# Patient Record
Sex: MALE | Race: WHITE
[De-identification: names, ages, dates, MRNs, and addresses within clinical notes are randomized per-mention and may not be internally consistent; named-entity substitution may affect disease eponyms.]

---

## 2020-07-26 ENCOUNTER — HOSPITAL ENCOUNTER (EMERGENCY)
Dept: HOSPITAL 94 - ER | Age: 60
LOS: 3 days | Discharge: HOME | End: 2020-07-29
Payer: COMMERCIAL

## 2020-07-26 VITALS — BODY MASS INDEX: 26.73 KG/M2 | WEIGHT: 176.37 LBS | HEIGHT: 68 IN

## 2020-07-26 DIAGNOSIS — F29: Primary | ICD-10-CM

## 2020-07-26 LAB
ALBUMIN SERPL BCP-MCNC: 3.5 G/DL (ref 3.4–5)
ALBUMIN/GLOB SERPL: 1.1 {RATIO} (ref 1.1–1.5)
ALP SERPL-CCNC: 84 IU/L (ref 46–116)
ALT SERPL W P-5'-P-CCNC: 53 U/L (ref 12–78)
AMPHETAMINES UR QL SCN: POSITIVE
ANION GAP SERPL CALCULATED.3IONS-SCNC: 10 MMOL/L (ref 8–16)
AST SERPL W P-5'-P-CCNC: 75 U/L (ref 10–37)
BARBITURATES UR QL SCN: NEGATIVE
BASOPHILS # BLD AUTO: 0 X10'3 (ref 0–0.2)
BASOPHILS NFR BLD AUTO: 0.4 % (ref 0–1)
BENZODIAZ UR QL SCN: NEGATIVE
BILIRUB SERPL-MCNC: 0.6 MG/DL (ref 0.1–1)
BUN SERPL-MCNC: 27 MG/DL (ref 7–18)
BUN/CREAT SERPL: 20.9 (ref 5.4–32)
BZE UR QL SCN: NEGATIVE
CALCIUM SERPL-MCNC: 8.3 MG/DL (ref 8.5–10.1)
CANNABINOIDS UR QL SCN: NEGATIVE
CHLORIDE SERPL-SCNC: 105 MMOL/L (ref 99–107)
CO2 SERPL-SCNC: 25.9 MMOL/L (ref 24–32)
CREAT SERPL-MCNC: 1.29 MG/DL (ref 0.6–1.1)
EOSINOPHIL # BLD AUTO: 0.2 X10'3 (ref 0–0.9)
EOSINOPHIL NFR BLD AUTO: 2 % (ref 0–6)
ERYTHROCYTE [DISTWIDTH] IN BLOOD BY AUTOMATED COUNT: 12.9 % (ref 11.5–14.5)
ETHANOL SERPL-MCNC: < 0.01 GM/DL (ref 0–0.01)
GFR SERPL CREATININE-BSD FRML MDRD: 57 ML/MIN
GLUCOSE SERPL-MCNC: 93 MG/DL (ref 70–104)
HCT VFR BLD AUTO: 41 % (ref 42–52)
HGB BLD-MCNC: 14 G/DL (ref 14–17.9)
LYMPHOCYTES # BLD AUTO: 1.7 X10'3 (ref 1.1–4.8)
LYMPHOCYTES NFR BLD AUTO: 20.1 % (ref 21–51)
MCH RBC QN AUTO: 32.3 PG (ref 27–31)
MCHC RBC AUTO-ENTMCNC: 34.2 G/DL (ref 33–36.5)
MCV RBC AUTO: 94.3 FL (ref 78–98)
METHADONE UR QL SCN: NEGATIVE
MONOCYTES # BLD AUTO: 0.7 X10'3 (ref 0–0.9)
MONOCYTES NFR BLD AUTO: 8.2 % (ref 2–12)
NEUTROPHILS # BLD AUTO: 5.7 X10'3 (ref 1.8–7.7)
NEUTROPHILS NFR BLD AUTO: 69.3 % (ref 42–75)
OPIATES UR QL SCN: NEGATIVE
PCP UR QL SCN: NEGATIVE
PLATELET # BLD AUTO: 248 X10'3 (ref 140–440)
PMV BLD AUTO: 9 FL (ref 7.4–10.4)
POTASSIUM SERPL-SCNC: 2.9 MMOL/L (ref 3.5–5.1)
PROT SERPL-MCNC: 6.6 G/DL (ref 6.4–8.2)
RBC # BLD AUTO: 4.35 X10'6 (ref 4.7–6.1)
SODIUM SERPL-SCNC: 141 MMOL/L (ref 135–145)
WBC # BLD AUTO: 8.3 X10'3 (ref 4.5–11)

## 2020-07-26 PROCEDURE — 81003 URINALYSIS AUTO W/O SCOPE: CPT

## 2020-07-26 PROCEDURE — 80053 COMPREHEN METABOLIC PANEL: CPT

## 2020-07-26 PROCEDURE — 36415 COLL VENOUS BLD VENIPUNCTURE: CPT

## 2020-07-26 PROCEDURE — 85025 COMPLETE CBC W/AUTO DIFF WBC: CPT

## 2020-07-26 PROCEDURE — 80305 DRUG TEST PRSMV DIR OPT OBS: CPT

## 2020-07-26 PROCEDURE — 84132 ASSAY OF SERUM POTASSIUM: CPT

## 2020-07-26 PROCEDURE — 84443 ASSAY THYROID STIM HORMONE: CPT

## 2020-07-26 PROCEDURE — 80320 DRUG SCREEN QUANTALCOHOLS: CPT

## 2020-07-26 PROCEDURE — 99291 CRITICAL CARE FIRST HOUR: CPT

## 2020-07-26 PROCEDURE — 96372 THER/PROPH/DIAG INJ SC/IM: CPT

## 2020-07-26 NOTE — NUR
PATIENT IS ASLEEP NOW. LABWORK DRAWN PER , WITH STAFF ASSISTANCE. 
PATIENT REMAINS ASLEEP WITH HR 90/MIN AND O2 SAT 93% ON ROOM AIR.

## 2020-07-26 NOTE — NUR
PATIENT IS ANXIOUS AND TALKING WITH UNORGANIZED THOUGHT PROCESS. UNCOOPERATIVE 
WITH IV START, LAB DRAW, AND PUTTING ON GREEN SCRUBS. CONTINUES TO ONLY ASK FOR 
WATER AND FOOD. 4 OZ ORANGE JUICE GIVEN WITH ORAL MEDICATION.
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
## 2020-07-26 NOTE — NUR
RR is 18, patient is lying flat on his back in bed, snoring loudly. Patient 
does not appear to be in any distress.

## 2020-07-27 LAB — POTASSIUM SERPL-SCNC: 3.9 MMOL/L (ref 3.5–5.1)

## 2020-07-27 RX ADMIN — POTASSIUM CHLORIDE PRN MEQ: 1500 TABLET, EXTENDED RELEASE ORAL at 01:39

## 2020-07-27 RX ADMIN — POTASSIUM CHLORIDE PRN MEQ: 1500 TABLET, EXTENDED RELEASE ORAL at 05:41
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
## 2020-07-27 NOTE — NUR
RECEIVED REPORT FROM AMELIA HALE. PT IS CURRENTLY LYING IN HIS BED ON HIS LEFT 
SIDE WTIH BLANKETS COVERING TO HIS SHOULDERS. DINNER TRAY REMOVED FROM BEDSIDE 
AND NOTED TO HAVE ALMOST 100% EATEN.

## 2020-07-27 NOTE — NUR
Pt is in bed, sleeping at this time lying on his back with visible 
respirations. Pt does not show sign of pain or distress, is in direct line of 
sight of the nursing station.

## 2020-07-27 NOTE — NUR
PT APPEARS TO BE SLEEPING, LYING ON HIS LEFT SIDE WITH BLANKETS COVERING TO HIS 
CHEST. RR 14 AND UNLABORED. SITTER AND RN WITHIN VIEW OF PT AAT.

## 2020-07-28 VITALS — SYSTOLIC BLOOD PRESSURE: 146 MMHG | DIASTOLIC BLOOD PRESSURE: 92 MMHG

## 2020-07-28 LAB
CLARITY UR: CLEAR
COLOR UR: (no result)
GLUCOSE UR STRIP-MCNC: NEGATIVE MG/DL
HGB UR QL STRIP: NEGATIVE
KETONES UR STRIP-MCNC: NEGATIVE MG/DL
LEUKOCYTE ESTERASE UR QL STRIP: NEGATIVE
NITRITE UR QL STRIP: NEGATIVE
PH UR STRIP: 7.5 [PH] (ref 4.8–8)
PROT UR QL STRIP: NEGATIVE MG/DL
SP GR UR STRIP: <=1.005 (ref 1–1.03)
URN COLLECT METHOD CLASS: (no result)
UROBILINOGEN UR STRIP-MCNC: 0.2 E.U/DL (ref 0.2–1)

## 2020-07-28 NOTE — NUR
Patient resting in bed after eating lunch, pleasant and calm. Took Ativan and 
Librium for initiation of ETOH protocol. Patient states last drink was 4 days 
ago.

## 2020-07-28 NOTE — NUR
pt up in bed to urinate given urinal halie that we can collect urinate sample ,pt 
urinated in toilet and was not able to collect urine sample primary nurse 
aware,instructed pt to drink more water halie that we can collect urine sample,pt 
verbalized understanding .

## 2020-07-28 NOTE — NUR
Received call from [facility] stating Shekhar Gonzalez accepted 
patient at 1050 by Dr. Behniwall. Will be picked up at 1300 to arrive by 1600.

-------------------------------------------------------------------------------

Addendum: 07/28/20 at 1110 by GRACIELA

-------------------------------------------------------------------------------

WRONG PATIENT DOCUMENTATION

## 2020-07-28 NOTE — NUR
PT SLEEPING, LYING ON HIS RIGHT SIDE WITH BLANKETS COVERING TO HIS SHOUDERS. RR 
14 AND UNLABORED. SITTER AND RN WITHIN VIEW AAT.

## 2020-07-28 NOTE — NUR
NO EVIDENCE FOUND IN THIS CHART THAT PTS PACKET HAS BEEN SENT TO [facility]. PACKET 
JUST FAXED. TSH ADDED TO LABS AS THIS IS [facility] ACCEPTANCE REQUIREMENT.

## 2020-07-28 NOTE — NUR
Made MD aware of patient having tremors and agitation, initiated librium 
protocol to start and 0.5 mg Ativan ordered.

## 2020-07-28 NOTE — NUR
Patient c/o body aches and soreness. Received order for Tylenol. Will evaluate 
effectiveness. Will cont. to monitor.